# Patient Record
Sex: MALE | Race: WHITE | URBAN - METROPOLITAN AREA
[De-identification: names, ages, dates, MRNs, and addresses within clinical notes are randomized per-mention and may not be internally consistent; named-entity substitution may affect disease eponyms.]

---

## 2019-09-19 ENCOUNTER — APPOINTMENT (RX ONLY)
Dept: URBAN - METROPOLITAN AREA CLINIC 23 | Facility: CLINIC | Age: 49
Setting detail: DERMATOLOGY
End: 2019-09-19

## 2019-09-19 DIAGNOSIS — Z87.2 PERSONAL HISTORY OF DISEASES OF THE SKIN AND SUBCUTANEOUS TISSUE: ICD-10-CM

## 2019-09-19 DIAGNOSIS — L57.8 OTHER SKIN CHANGES DUE TO CHRONIC EXPOSURE TO NONIONIZING RADIATION: ICD-10-CM

## 2019-09-19 DIAGNOSIS — L40.0 PSORIASIS VULGARIS: ICD-10-CM

## 2019-09-19 PROBLEM — J30.1 ALLERGIC RHINITIS DUE TO POLLEN: Status: ACTIVE | Noted: 2019-09-19

## 2019-09-19 PROBLEM — L20.84 INTRINSIC (ALLERGIC) ECZEMA: Status: ACTIVE | Noted: 2019-09-19

## 2019-09-19 PROBLEM — E78.5 HYPERLIPIDEMIA, UNSPECIFIED: Status: ACTIVE | Noted: 2019-09-19

## 2019-09-19 PROBLEM — L55.1 SUNBURN OF SECOND DEGREE: Status: ACTIVE | Noted: 2019-09-19

## 2019-09-19 PROCEDURE — 99214 OFFICE O/P EST MOD 30 MIN: CPT

## 2019-09-19 PROCEDURE — ? PRESCRIPTION

## 2019-09-19 PROCEDURE — ? COUNSELING

## 2019-09-19 PROCEDURE — ? TREATMENT REGIMEN

## 2019-09-19 PROCEDURE — ? OTHER

## 2019-09-19 RX ORDER — HYDROCORTISONE 25 MG/ML
LOTION TOPICAL
Qty: 1 | Refills: 3 | Status: ERX | COMMUNITY
Start: 2019-09-19

## 2019-09-19 RX ORDER — HALOBETASOL PROPIONATE AND TAZAROTENE .1; .45 MG/G; MG/G
LOTION TOPICAL
Qty: 1 | Refills: 1 | Status: ERX | COMMUNITY
Start: 2019-09-19

## 2019-09-19 RX ADMIN — HALOBETASOL PROPIONATE AND TAZAROTENE: .1; .45 LOTION TOPICAL at 00:00

## 2019-09-19 RX ADMIN — HYDROCORTISONE: 25 LOTION TOPICAL at 00:00

## 2019-09-19 ASSESSMENT — LOCATION SIMPLE DESCRIPTION DERM
LOCATION SIMPLE: RIGHT KNEE
LOCATION SIMPLE: LEFT ELBOW
LOCATION SIMPLE: LEFT UPPER BACK
LOCATION SIMPLE: RIGHT UPPER BACK
LOCATION SIMPLE: RIGHT FOREARM
LOCATION SIMPLE: LEFT KNEE

## 2019-09-19 ASSESSMENT — LOCATION DETAILED DESCRIPTION DERM
LOCATION DETAILED: RIGHT KNEE
LOCATION DETAILED: LEFT KNEE
LOCATION DETAILED: LEFT SUPERIOR MEDIAL UPPER BACK
LOCATION DETAILED: LEFT ELBOW
LOCATION DETAILED: RIGHT SUPERIOR UPPER BACK
LOCATION DETAILED: RIGHT PROXIMAL DORSAL FOREARM

## 2019-09-19 ASSESSMENT — LOCATION ZONE DERM
LOCATION ZONE: LEG
LOCATION ZONE: ARM
LOCATION ZONE: TRUNK

## 2019-09-19 NOTE — PROCEDURE: OTHER
Note Text (......Xxx Chief Complaint.): This diagnosis correlates with the
Other (Free Text): No complaints of arthritis. He does not want any biologics.
Detail Level: Simple

## 2020-12-16 ENCOUNTER — APPOINTMENT (RX ONLY)
Dept: URBAN - METROPOLITAN AREA CLINIC 23 | Facility: CLINIC | Age: 50
Setting detail: DERMATOLOGY
End: 2020-12-16

## 2020-12-16 VITALS — TEMPERATURE: 98.1 F

## 2020-12-16 DIAGNOSIS — L57.0 ACTINIC KERATOSIS: ICD-10-CM

## 2020-12-16 DIAGNOSIS — L57.8 OTHER SKIN CHANGES DUE TO CHRONIC EXPOSURE TO NONIONIZING RADIATION: ICD-10-CM

## 2020-12-16 DIAGNOSIS — Z87.2 PERSONAL HISTORY OF DISEASES OF THE SKIN AND SUBCUTANEOUS TISSUE: ICD-10-CM

## 2020-12-16 DIAGNOSIS — L40.8 OTHER PSORIASIS: ICD-10-CM

## 2020-12-16 DIAGNOSIS — L40.0 PSORIASIS VULGARIS: ICD-10-CM

## 2020-12-16 PROBLEM — J30.1 ALLERGIC RHINITIS DUE TO POLLEN: Status: ACTIVE | Noted: 2020-12-16

## 2020-12-16 PROBLEM — E78.5 HYPERLIPIDEMIA, UNSPECIFIED: Status: ACTIVE | Noted: 2020-12-16

## 2020-12-16 PROBLEM — L20.84 INTRINSIC (ALLERGIC) ECZEMA: Status: ACTIVE | Noted: 2020-12-16

## 2020-12-16 PROBLEM — L55.1 SUNBURN OF SECOND DEGREE: Status: ACTIVE | Noted: 2020-12-16

## 2020-12-16 PROBLEM — L29.8 OTHER PRURITUS: Status: ACTIVE | Noted: 2020-12-16

## 2020-12-16 PROBLEM — L85.3 XEROSIS CUTIS: Status: ACTIVE | Noted: 2020-12-16

## 2020-12-16 PROCEDURE — ? OTHER

## 2020-12-16 PROCEDURE — ? COUNSELING

## 2020-12-16 PROCEDURE — 17003 DESTRUCT PREMALG LES 2-14: CPT

## 2020-12-16 PROCEDURE — ? TREATMENT REGIMEN

## 2020-12-16 PROCEDURE — 17000 DESTRUCT PREMALG LESION: CPT

## 2020-12-16 PROCEDURE — 99214 OFFICE O/P EST MOD 30 MIN: CPT | Mod: 25

## 2020-12-16 PROCEDURE — ? LIQUID NITROGEN

## 2020-12-16 ASSESSMENT — LOCATION ZONE DERM
LOCATION ZONE: SCALP
LOCATION ZONE: TRUNK
LOCATION ZONE: NOSE
LOCATION ZONE: LEG
LOCATION ZONE: ARM
LOCATION ZONE: FACE

## 2020-12-16 ASSESSMENT — LOCATION DETAILED DESCRIPTION DERM
LOCATION DETAILED: RIGHT NASAL ALA
LOCATION DETAILED: LEFT DISTAL PRETIBIAL REGION
LOCATION DETAILED: RIGHT SUPERIOR UPPER BACK
LOCATION DETAILED: LEFT LATERAL DISTAL PRETIBIAL REGION
LOCATION DETAILED: LEFT SUPERIOR MEDIAL UPPER BACK
LOCATION DETAILED: LEFT SUPERIOR POSTAURICULAR SKIN
LOCATION DETAILED: LEFT NASAL ALAR GROOVE
LOCATION DETAILED: RIGHT PROXIMAL DORSAL FOREARM
LOCATION DETAILED: RIGHT DISTAL DORSAL FOREARM
LOCATION DETAILED: RIGHT PROXIMAL PRETIBIAL REGION
LOCATION DETAILED: RIGHT CENTRAL MALAR CHEEK
LOCATION DETAILED: LEFT FOREHEAD

## 2020-12-16 ASSESSMENT — LOCATION SIMPLE DESCRIPTION DERM
LOCATION SIMPLE: RIGHT FOREARM
LOCATION SIMPLE: RIGHT NOSE
LOCATION SIMPLE: LEFT PRETIBIAL REGION
LOCATION SIMPLE: RIGHT CHEEK
LOCATION SIMPLE: LEFT NOSE
LOCATION SIMPLE: RIGHT UPPER BACK
LOCATION SIMPLE: SCALP
LOCATION SIMPLE: RIGHT PRETIBIAL REGION
LOCATION SIMPLE: LEFT FOREHEAD
LOCATION SIMPLE: LEFT UPPER BACK

## 2020-12-16 NOTE — PROCEDURE: OTHER
Detail Level: Simple
Note Text (......Xxx Chief Complaint.): This diagnosis correlates with the
Other (Free Text): No complaints of arthritis.

## 2020-12-16 NOTE — PROCEDURE: LIQUID NITROGEN
Render Post-Care Instructions In Note?: no
Consent: The patient's verbal  consent was obtained including but not limited to risks of crusting, scabbing, blistering, scarring, darker or lighter pigmentary change, recurrence, incomplete removal and infection.
Detail Level: Simple
Post-Care Instructions: I reviewed with the patient in detail post-care instructions. Patient is to wear sunprotection, and avoid picking at any of the treated lesions. Pt may apply Vaseline to crusted or scabbing areas.
Duration Of Freeze Thaw-Cycle (Seconds): 3

## 2020-12-16 NOTE — PROCEDURE: TREATMENT REGIMEN
Plan: He declined prescription refills. He claims he’s doing okay and doesn’t really want to use topical steroids.
Action 2: Continue
Detail Level: Zone
Detail Level: Detailed
Continue Regimen: Hydrocortisone lotion

## 2025-04-14 ENCOUNTER — APPOINTMENT (OUTPATIENT)
Dept: URBAN - METROPOLITAN AREA CLINIC 23 | Facility: CLINIC | Age: 55
Setting detail: DERMATOLOGY
End: 2025-04-14

## 2025-04-14 DIAGNOSIS — Z87.2 PERSONAL HISTORY OF DISEASES OF THE SKIN AND SUBCUTANEOUS TISSUE: ICD-10-CM

## 2025-04-14 DIAGNOSIS — L57.8 OTHER SKIN CHANGES DUE TO CHRONIC EXPOSURE TO NONIONIZING RADIATION: ICD-10-CM | Status: STABLE

## 2025-04-14 DIAGNOSIS — Z71.89 OTHER SPECIFIED COUNSELING: ICD-10-CM

## 2025-04-14 PROCEDURE — 99203 OFFICE O/P NEW LOW 30 MIN: CPT

## 2025-04-14 PROCEDURE — ? COUNSELING

## 2025-04-14 ASSESSMENT — LOCATION DETAILED DESCRIPTION DERM
LOCATION DETAILED: RIGHT SUPERIOR UPPER BACK
LOCATION DETAILED: LEFT SUPERIOR UPPER BACK

## 2025-04-14 ASSESSMENT — LOCATION ZONE DERM: LOCATION ZONE: TRUNK

## 2025-04-14 ASSESSMENT — LOCATION SIMPLE DESCRIPTION DERM
LOCATION SIMPLE: LEFT UPPER BACK
LOCATION SIMPLE: RIGHT UPPER BACK

## 2025-04-16 ENCOUNTER — OFFICE VISIT (OUTPATIENT)
Age: 55
End: 2025-04-16
Payer: COMMERCIAL

## 2025-04-16 DIAGNOSIS — M54.50 LUMBAR PAIN: Primary | ICD-10-CM

## 2025-04-16 DIAGNOSIS — M54.16 LUMBAR RADICULOPATHY: ICD-10-CM

## 2025-04-16 PROCEDURE — 99204 OFFICE O/P NEW MOD 45 MIN: CPT | Performed by: ORTHOPAEDIC SURGERY

## 2025-04-16 RX ORDER — AMLODIPINE BESYLATE 5 MG/1
5 TABLET ORAL DAILY
COMMUNITY

## 2025-04-16 RX ORDER — CARVEDILOL 12.5 MG/1
TABLET ORAL
COMMUNITY
Start: 2025-01-08

## 2025-04-16 RX ORDER — PREDNISONE 5 MG/1
5 TABLET ORAL SEE ADMIN INSTRUCTIONS
Qty: 1 EACH | Refills: 0 | Status: SHIPPED | OUTPATIENT
Start: 2025-04-16 | End: 2025-04-28

## 2025-04-16 RX ORDER — CELECOXIB 200 MG/1
CAPSULE ORAL
COMMUNITY
Start: 2025-04-14

## 2025-04-16 RX ORDER — GABAPENTIN 300 MG/1
300 CAPSULE ORAL 3 TIMES DAILY
Qty: 90 CAPSULE | Refills: 0 | Status: SHIPPED | OUTPATIENT
Start: 2025-04-16 | End: 2025-05-16

## 2025-04-16 RX ORDER — FISH OIL/DHA/EPA 1200-144MG
1200 CAPSULE ORAL DAILY
COMMUNITY

## 2025-04-16 RX ORDER — ATORVASTATIN CALCIUM 80 MG/1
TABLET, FILM COATED ORAL
COMMUNITY
Start: 2025-01-08

## 2025-04-16 RX ORDER — VALSARTAN 320 MG/1
TABLET ORAL
COMMUNITY
Start: 2025-01-08

## 2025-04-16 RX ORDER — CYCLOBENZAPRINE HCL 5 MG
5 TABLET ORAL 3 TIMES DAILY PRN
Qty: 30 TABLET | Refills: 0 | Status: SHIPPED | OUTPATIENT
Start: 2025-04-16 | End: 2025-04-26

## 2025-04-16 NOTE — PROGRESS NOTES
Name: Hiral Yusuf  YOB: 1970  Gender: male  MRN: [id number]  Age: 54 y.o.      Chief Complaint: Low back pain and right leg pain    History of Present Illness:      This is a very pleasant 54 y.o. male who presents with a acute history of low back pain with radiation down the right leg.  He also states that he gets numbness in both legs.  Pain is 7/10 in severity.  Pain is worse with walking.  He describes the pain as sharp.  He denies any inciting injury.  So far he has tried NSAIDs.      Medications:       Current Outpatient Medications:     amLODIPine (NORVASC) 5 MG tablet, Take 1 tablet by mouth daily, Disp: , Rfl:     atorvastatin (LIPITOR) 80 MG tablet, , Disp: , Rfl:     carvedilol (COREG) 12.5 MG tablet, , Disp: , Rfl:     celecoxib (CELEBREX) 200 MG capsule, TAKE 1 CAPSULE BY MOUTH TWICE DAILY AS NEEDED, Disp: , Rfl:     Omega-3 Fatty Acids (KP FISH OIL) 1200 MG CAPS, Take 1,200 mg by mouth daily, Disp: , Rfl:     valsartan (DIOVAN) 320 MG tablet, , Disp: , Rfl:     predniSONE 5 MG (48) TBPK, Take 5 mg by mouth See Admin Instructions for 12 days Day 1-4: 2 tablets breakfast, 1 lunch, 1 dinner, 2 tablets bedtime.  If started late in the day, take 2 tablets every hour for 3 hours. Day 5-8: 1 tablet breakfast, 1 lunch, 1 dinner, 1 bedtime. Day 9-12: 1 tablet breakfast and 1 bedtime., Disp: 1 each, Rfl: 0    gabapentin (NEURONTIN) 300 MG capsule, Take 1 capsule by mouth 3 times daily for 30 days. Intended supply: 30 days, Disp: 90 capsule, Rfl: 0    cyclobenzaprine (FLEXERIL) 5 MG tablet, Take 1 tablet by mouth 3 times daily as needed for Muscle spasms, Disp: 30 tablet, Rfl: 0    Allergies:    Allergies   Allergen Reactions    Lisinopril Other (See Comments)     Caused dizziness    Other reaction(s): Other (comments)   Caused cough         Physical Exam:     This is a well developed well nourished male adult in no acute distress.     Mood and affect are appropriate.    Oriented to person,

## 2025-04-28 ENCOUNTER — TELEPHONE (OUTPATIENT)
Dept: ORTHOPEDIC SURGERY | Age: 55
End: 2025-04-28

## 2025-04-28 NOTE — TELEPHONE ENCOUNTER
Patient calling to schedule an MRI follow up appointment and he requests as late in the afternoon as possible.  There isn't any later afternoon appointments available until 5/20/25.  Patient asks if Dr. Draper can call him to discuss the MRI results or if he can worked in tomorrow or Wednesday in the afternoon.  He states he is out of work today if Dr. Draper is available to call him with his results.

## 2025-04-30 ENCOUNTER — OFFICE VISIT (OUTPATIENT)
Age: 55
End: 2025-04-30
Payer: COMMERCIAL

## 2025-04-30 DIAGNOSIS — M54.16 LUMBAR RADICULOPATHY: Primary | ICD-10-CM

## 2025-04-30 PROCEDURE — 99213 OFFICE O/P EST LOW 20 MIN: CPT | Performed by: ORTHOPAEDIC SURGERY

## 2025-04-30 NOTE — PROGRESS NOTES
Name: Hiral Ysuuf  YOB: 1970  Gender: male  MRN: [id number]  Age: 54 y.o.      Chief Complaint: MRI follow-up    History of Present Illness:      This is a very pleasant 54 y.o. male who presents with a acute history of low back pain with radiation on the right leg.  He states currently his pain is 1/10 in severity.  His pain is significantly improved since last time I saw him.  He is here for MRI follow-up.         Medications:       Current Outpatient Medications:     amLODIPine (NORVASC) 5 MG tablet, Take 1 tablet by mouth daily, Disp: , Rfl:     atorvastatin (LIPITOR) 80 MG tablet, , Disp: , Rfl:     carvedilol (COREG) 12.5 MG tablet, , Disp: , Rfl:     celecoxib (CELEBREX) 200 MG capsule, TAKE 1 CAPSULE BY MOUTH TWICE DAILY AS NEEDED, Disp: , Rfl:     Omega-3 Fatty Acids (KP FISH OIL) 1200 MG CAPS, Take 1,200 mg by mouth daily, Disp: , Rfl:     valsartan (DIOVAN) 320 MG tablet, , Disp: , Rfl:     gabapentin (NEURONTIN) 300 MG capsule, Take 1 capsule by mouth 3 times daily for 30 days. Intended supply: 30 days, Disp: 90 capsule, Rfl: 0    Allergies:    Allergies   Allergen Reactions    Lisinopril Other (See Comments)     Caused dizziness    Other reaction(s): Other (comments)   Caused cough         Physical Exam:     This is a well developed well nourished male adult in no acute distress.     Mood and affect are appropriate.    Oriented to person, place, and time.    Respirations are unlabored and there is no evidence of cyanosis    The patient ambulates with an normal gait.     There is minimal hip irritability with internal or external rotation bilaterally.      Pulses in the lower extremities are palpable and equal  Sensory testing:     L2 L3 L4 L5 S1 S2   Right 2 2 2 2 2 2   Left 2 2 2 2 2 2     0=absent; 1=altered; 2=normal; NT= not tested  Reflexes    Reflexes   Right Left   Quadriceps (L4) 2 2   Achilles (S1) 2 2     Strength testing in the lower extremity reveals the following based on